# Patient Record
Sex: FEMALE | Race: BLACK OR AFRICAN AMERICAN | Employment: OTHER | URBAN - METROPOLITAN AREA
[De-identification: names, ages, dates, MRNs, and addresses within clinical notes are randomized per-mention and may not be internally consistent; named-entity substitution may affect disease eponyms.]

---

## 2017-08-13 ENCOUNTER — HOSPITAL ENCOUNTER (EMERGENCY)
Age: 62
Discharge: HOME | End: 2017-08-13
Attending: EMERGENCY MEDICINE
Payer: SELF-PAY

## 2017-08-13 VITALS
DIASTOLIC BLOOD PRESSURE: 80 MMHG | RESPIRATION RATE: 18 BRPM | OXYGEN SATURATION: 99 % | BODY MASS INDEX: 22.2 KG/M2 | HEIGHT: 64 IN | HEART RATE: 83 BPM | SYSTOLIC BLOOD PRESSURE: 141 MMHG | WEIGHT: 130 LBS | TEMPERATURE: 98.8 F

## 2017-08-13 DIAGNOSIS — K08.89 PAIN, DENTAL: Primary | ICD-10-CM

## 2017-08-13 LAB
GLUCOSE BLD STRIP.AUTO-MCNC: 84 MG/DL (ref 65–100)
SERVICE CMNT-IMP: NORMAL

## 2017-08-13 PROCEDURE — 82962 GLUCOSE BLOOD TEST: CPT

## 2017-08-13 PROCEDURE — 74011250637 HC RX REV CODE- 250/637: Performed by: EMERGENCY MEDICINE

## 2017-08-13 PROCEDURE — 99284 EMERGENCY DEPT VISIT MOD MDM: CPT

## 2017-08-13 RX ORDER — METRONIDAZOLE 250 MG/1
500 TABLET ORAL
Status: COMPLETED | OUTPATIENT
Start: 2017-08-13 | End: 2017-08-13

## 2017-08-13 RX ORDER — METRONIDAZOLE 500 MG/1
500 TABLET ORAL 3 TIMES DAILY
Qty: 30 TAB | Refills: 0 | Status: SHIPPED | OUTPATIENT
Start: 2017-08-13 | End: 2017-08-23

## 2017-08-13 RX ORDER — PENICILLIN V POTASSIUM 250 MG/1
500 TABLET, FILM COATED ORAL
Status: COMPLETED | OUTPATIENT
Start: 2017-08-13 | End: 2017-08-13

## 2017-08-13 RX ORDER — PENICILLIN V POTASSIUM 500 MG/1
500 TABLET, FILM COATED ORAL 4 TIMES DAILY
Qty: 40 TAB | Refills: 0 | Status: SHIPPED | OUTPATIENT
Start: 2017-08-13 | End: 2017-08-23

## 2017-08-13 RX ADMIN — PENICILLIN V POTASIUM 500 MG: 250 TABLET ORAL at 12:10

## 2017-08-13 RX ADMIN — METRONIDAZOLE 500 MG: 250 TABLET ORAL at 12:10

## 2017-08-13 NOTE — ED PROVIDER NOTES
HPI Comments: 63 y.o. female with no significant past medical history who presents from [facility] accompanied by friend with chief complaint of dental pain. Patient states she had onset of tooth ache to \"bottom right\" ~2 days ago. She notes tooth causing her pain has an existing crack in it. Patient denies any drainage from area. She reports swishing out mouth with peroxide and notes she has also crushed up ASA tablet and placed it around tooth with some improvement in pain. Patient reports additional onset of swelling to right jaw and friend at [facility] suggested patient visit ED. Patient denies having dentist or PCP and notes \"I do not really go to the Doctor\". She reports hx of dental abscess x1 in the past. Patient notes she brushes her teeth \"once a day\". She denies taking any medications regularly. Patient denies any fever, nausea, vomiting. There are no other acute medical concerns at this time. Social hx: non-smoker. Denies ETOH use. Significant FMHx: Father: DM.     PCP: None    Note written by Altagracia Johnson. Rufino Hunt, as dictated by Talha Mathews, DO 11:48 AM    OLD CHART REVIEW:  No old ED visits in system    The history is provided by the patient. No  was used. No past medical history on file. No past surgical history on file. No family history on file. Social History     Social History    Marital status:      Spouse name: N/A    Number of children: N/A    Years of education: N/A     Occupational History    Not on file. Social History Main Topics    Smoking status: Not on file    Smokeless tobacco: Not on file    Alcohol use Not on file    Drug use: Not on file    Sexual activity: Not on file     Other Topics Concern    Not on file     Social History Narrative         ALLERGIES: Review of patient's allergies indicates no known allergies. Review of Systems   Constitutional: Negative for chills, fatigue and fever.    HENT: Positive for dental problem (bottom right molar). Negative for congestion, rhinorrhea and sore throat. Eyes: Negative for visual disturbance. Respiratory: Negative for cough and shortness of breath. Cardiovascular: Negative for chest pain. Gastrointestinal: Negative for abdominal pain, diarrhea, nausea and vomiting. Genitourinary: Negative for difficulty urinating, dysuria and hematuria. Musculoskeletal: Negative for back pain and neck pain. Skin: Negative for rash and wound. Neurological: Negative for dizziness and headaches. All other systems reviewed and are negative. Vitals:    08/13/17 1111   BP: 141/80   Pulse: 83   Resp: 18   Temp: 98.8 °F (37.1 °C)   SpO2: 99%   Weight: 59 kg (130 lb)   Height: 5' 4\" (1.626 m)            Physical Exam      Constitutional: Pt is awake and alert. Pt appears well-developed and well-nourished. NAD. HENT:   Head: Normocephalic and atraumatic. Nose: Nose normal.   Mouth/Throat: Oropharynx is clear and moist. No oropharyngeal exudate. Floor of mouth is tender. It is not woody. + mild Swelling of right mandible. Mild trismus. Tender adenopathy of right anterior cervical chain. Filling in 1st and 2nd molars. #rd molar is eroded. Mild fullness of gingiva without fluctuance. Normal voice. No drooling. Eyes: Conjunctivae and extraocular motions are normal. Pupils are equal, round, and reactive to light. Right eye exhibits no discharge. Left eye exhibits no discharge. No scleral icterus. Neck: No tracheal deviation present. Supple neck. Full ROM of neck. Cardiovascular: Normal rate, regular rhythm, normal heart sounds and intact distal pulses. Exam reveals no gallop and no friction rub. No murmur heard. Pulmonary/Chest: Effort normal and breath sounds normal.  Pt  has no wheezes. Pt  has no rales. Abdominal: Soft. Pt  exhibits no distension and no mass. No tenderness. Pt  has no rebound and no guarding.    Musculoskeletal:  Pt  exhibits no edema and no tenderness. Ext: Normal ROM in all four extremities; not tender to palpation; distal pulses are normal, no edema. Neurological:  Pt is alert. nonfocal neuro exam.  Skin: Skin is warm and dry. Pt  is not diaphoretic. Psychiatric:  Pt  has a normal mood and affect. Behavior is normal.   Note written by Honey Rangel. Selene Lukejulien, as dictated by Brenna Garcia, DO 11:48 AM      [facility]  ED Course       Procedures        Pt, her friend and I talked about options. One option is to go on course of abx and see how she does. Other option is to incise her gingiva and try to evacuate any purulence. If any, it would be a small amount, perhaps none. I think either way is acceptable. She chose the first option and no I&D. She will return if worse. F/u Dentistry in the community. List of free clinics given as well. I did tell her that if her swelling gets worse and if she has to return to the ED, we do not have oral surgery here. I suggested going to [facility] ED if she worsens since she may need I&D or a trip to the OR. She understands.     Recent Results (from the past 24 hour(s))   GLUCOSE, POC    Collection Time: 08/13/17 12:10 PM   Result Value Ref Range    Glucose (POC) 84 65 - 100 mg/dL    Performed by Fili Deng

## 2017-08-13 NOTE — ED TRIAGE NOTES
Pt reports she began with tooth pain on right lower jaw on Friday, pain has continued and now pt has swelling to right side of her face.

## 2017-08-13 NOTE — DISCHARGE INSTRUCTIONS
We hope that we have addressed all of your medical concerns. The examination and treatment you received in the Emergency Department were for an emergent problem and were not intended as complete care. It is important that you follow up with your healthcare provider(s) for ongoing care. If your symptoms worsen or do not improve as expected, and you are unable to reach your usual health care provider(s), you should return to the Emergency Department. Today's healthcare is undergoing tremendous change, and patient satisfaction surveys are one of the many tools to assess the quality of medical care. You may receive a survey from the [employer] regarding your experience in the Emergency Department. I hope that your experience has been completely positive, particularly the medical care that I provided. As such, please participate in the survey; anything less than excellent does not meet my expectations or intentions. [facility] and [address] [place] [address] participate in nationally recognized quality of care measures. If your blood pressure is greater than 120/80, as reported below, we urge that you seek medical care to address the potential of high blood pressure, commonly known as hypertension. Hypertension can be hereditary or can be caused by certain medical conditions, pain, stress, or \"white coat syndrome. \"       Please make an appointment with your health care provider(s) for follow up of your Emergency Department visit. VITALS:   Patient Vitals for the past 8 hrs:   Temp Pulse Resp BP SpO2   08/13/17 1111 98.8 °F (37.1 °C) 83 18 141/80 99 %          Thank you for allowing us to provide you with medical care today. We realize that you have many choices for your emergency care needs. Please choose us in the future for any continued health care needs.       Regards,           Linden King, DO    [employer]   Office: [phone]            No results found for this or any previous visit (from the past 24 hour(s)). No results found. Abscessed Tooth: Care Instructions  Your Care Instructions    An abscessed tooth is a tooth that has a pocket of pus in the tissues around it. Pus forms when the body tries to fight an infection caused by bacteria. If the pus cannot drain, it forms an abscess. An abscessed tooth can cause red, swollen gums and throbbing pain, especially when you chew. You may have a bad taste in your mouth and a fever, and your jaw may swell. Damage to the tooth, untreated tooth decay, or gum disease can cause an abscessed tooth. An abscessed tooth needs to be treated by a dental professional right away. If it is not treated, the infection could spread to other parts of your body. Your dentist will give you antibiotics to stop the infection. He or she may make a hole in the tooth or cut open (brianda) the abscess inside your mouth so that the infection can drain, which should relieve your pain. You may need to have a root canal treatment, which tries to save your tooth by taking out the infected pulp and replacing it with a healing medicine and/or a filling. If these treatments do not work, your tooth may have to be removed. Follow-up care is a key part of your treatment and safety. Be sure to make and go to all appointments, and call your doctor if you are having problems. It's also a good idea to know your test results and keep a list of the medicines you take. How can you care for yourself at home? · Reduce pain and swelling in your face and jaw by putting ice or a cold pack on the outside of your cheek for 10 to 20 minutes at a time. Put a thin cloth between the ice and your skin. · Take pain medicines exactly as directed. ¨ If the doctor gave you a prescription medicine for pain, take it as prescribed.   ¨ If you are not taking a prescription pain medicine, ask your doctor if you can take an over-the-counter medicine. · Take your antibiotics as directed. Do not stop taking them just because you feel better. You need to take the full course of antibiotics. To prevent tooth abscess  · Brush and floss every day, and have regular dental checkups. · Eat a healthy diet, and avoid sugary foods and drinks. · Do not smoke, use e-cigarettes with nicotine, or use spit tobacco. Tobacco and nicotine slow your ability to heal. Tobacco also increases your risk for gum disease and cancer of the mouth and throat. If you need help quitting, talk to your doctor about stop-smoking programs and medicines. These can increase your chances of quitting for good. When should you call for help? Call 911 anytime you think you may need emergency care. For example, call if:  · You have trouble breathing. Call your doctor now or seek immediate medical care if:  · You have new or worse symptoms of infection, such as:  ¨ Increased pain, swelling, warmth, or redness. ¨ Red streaks leading from the area. ¨ Pus draining from the area. ¨ A fever. Watch closely for changes in your health, and be sure to contact your doctor if:  · You do not get better as expected. Where can you learn more? Go to http://lester-tushar.info/. Enter H044 in the search box to learn more about \"Abscessed Tooth: Care Instructions. \"  Current as of: September 19, 2016  Content Version: 11.3  © 8809-1959 [employer]. Care instructions adapted under license by [employer] (which disclaims liability or warranty for this information). If you have questions about a medical condition or this instruction, always ask your healthcare professional. [place] [address] any warranty or liability for your use of this information.         Emergency [address] by [facility]  [address] [facility], [address]  Open [place], W, F: 8AM - 5PM and T, Th: 8AM-6PM  Phone: [phone], press 4  $70 for Emergency Care  $60 for first routine care, then pay by sliding scale based upon income. [facility]  [facility] [address] [facility]Johan [address]well Final  Phone: [phone]    [employer]  [address]ohan [address]well Final  Open Monday - Friday 8AM - 4:30 PM  Phone: [address] [place] [address] of Dentistry Urgent [facility]5 [facility], 1000 [facility], [facility] 45, [facility] 1000 [facility] starting at 8:30 AM M-F  Phone: [phone], press 2  Fee: $150 per tooth (x-ray & extractions only)  Pediatrics Phone[de-identified] [phone], 8-5 M-F    [address] Dentistry, 1000 [facility], [facility] 45, 2nd Floor, [address] [place] [address] starting at 8:30 AM - 3 PM [facility] [address]  225 [facility], [address] [place] [address]  Phone: [phone] or [phone]  Emergency Hours: 9:30AM - 11AM (extractions)  Simple tooth extraction $ per tooth. #75 for x-ray    [facility] Residents only, over the age of 25  Phone: [phone]. Leave message saying you need an appointment to register. Hours: Tuesday Evenings      [facility] SYSTEM[facility] Departments     For adult and child immunizations, family planning, TB screening, STD testing and women's health services. [facility]: [place] [phone]      [facility] 25   657 [facility]   [address]   170 [facility]: [facility] [address] [facility] [address] [phone]      [address] [place] [address]          [address] [place] [address]     For primary care services, woman and child wellness, and some clinics providing specialty care. U -- [address] [place] [address]. [address] [place] [address] [phone]   [address] [place] [address] [facility] 200 [facility] [phone]   Melissa Pal [address] [facility] 70 5850  Community  [phone]   [address] [facility] [address] [address] [address] [phone]   [facility] 81 [facility] [phone]   Larue Challenger [facility] [address] [place] [address] [phone]   [facility]: [facility] kera Power 79 [facility], [address] [phone]     [place] [address] [facility] [address] [phone]   [facility] 5850 [facility]  [phone]   [employer]  [address], [place] [address] (www.[employer]/about/mission. asp) [phone]         Sexual Health/Woman Wellness Clinics    For STD/HIV testing and treatment, pregnancy testing and services, men's health, birth control services, LGBT services, and hepatitis/HPV vaccine services. [employer] for [place] [employer] [address] [facility] [address] [facility] [address] [facility] 1579 600 [facility] [phone]   [facility] [address] , 5th floor [phone]   Pregnancy [address] for Women [address]  [facility] [phone]         [place] [address]908 [facility] [address] [place] [address]   [phone]   [place]   [phone]   Women, Infant and Children's Services: [address] [phone]       [address] [phone]   [place] [facility]   [phone]   [facility]   [phone]   [facility] Psychiatry     [phone]   [address] Crisis   1212 [facility] [place] [phone]     Local Primary Care Physicians  [address] [facility] [address] [address] Family Physicians [phone]  Katherina Opitz, MD Shermon Brothers, MD Shereen Rudd, MD [facility] [phone]  [place] Render, FNP  Arnold Calkins, MD Hulon Hammans, MD [address] [phone]  [place] [address] [facility] [phone]  MD Elmer Gil MD Enos Better, MD Candance Ngo, MD   [facility] [phone]  GYXU USBXGF , MD Kelli Marsh, MD Nika Fong, NP [address] [phone]  Raghavendra Brewer, MD Silvano Chan, MD Cheryl Zee, MD Sujatha Phillips, MD Mckenna Coe, MD Margarita De, MD Dave Clinton, MD   33 98 [facility]  Aleksey Asp, MD [address] [facility] [phone]  Christy Garcia, MD Faye Keating, NP  Gene Lee, MD Nikolay Noel, MD Meme Allen, MD Courtney Foote, MD Rosangela Valle MD   9956 [facility] [phone]  Merline Friday, MD Geno Obrien, FNP  Meneses Shed, NP  Kalin Fall, MD Dominic Plascencia, MD Bing Phillips, MD Jess Nunez, MD [facility] [phone]  MD Ligia Chambers, MD Linda Warner, MD Davian Damon, MD Ted Farooq MD   [address] [phone]  MD Amy Oscar MD [place] [phone]  MD Doni Puentes MD Wenceslao Carrion, MD   [facility] Physicians [phone]  Noe Guzman, MD Estrada Kohli, MD Reed Alvarez, MD Marcus Baker, MD Dannielle Degroot, MD Emile Lugo, NP  Zelia Goltz, MD 1619  66   [phone]  MD Tessy Wilson, MD Jase Fair MD   2102 [facility] [phone]  Tjshruthi Samano, MD Мария Stanley, DANII Bonner, MARILYN Bonner, DANII Glynn, MD Anita Rosario, NP   Lucía Davis,  Miscellaneous:  Tate Hubbard [phone]